# Patient Record
Sex: FEMALE | Race: WHITE | URBAN - METROPOLITAN AREA
[De-identification: names, ages, dates, MRNs, and addresses within clinical notes are randomized per-mention and may not be internally consistent; named-entity substitution may affect disease eponyms.]

---

## 2024-05-31 ENCOUNTER — OFFICE VISIT (OUTPATIENT)
Dept: URGENT CARE | Age: 64
End: 2024-05-31

## 2024-05-31 VITALS
HEIGHT: 63 IN | OXYGEN SATURATION: 96 % | BODY MASS INDEX: 31.64 KG/M2 | TEMPERATURE: 98.1 F | WEIGHT: 178.6 LBS | HEART RATE: 87 BPM | SYSTOLIC BLOOD PRESSURE: 128 MMHG | DIASTOLIC BLOOD PRESSURE: 88 MMHG

## 2024-05-31 DIAGNOSIS — R30.0 DYSURIA: ICD-10-CM

## 2024-05-31 DIAGNOSIS — R82.998 OTHER ABNORMAL FINDINGS IN URINE: ICD-10-CM

## 2024-05-31 DIAGNOSIS — N39.0 RECURRENT UTI: Primary | ICD-10-CM

## 2024-05-31 DIAGNOSIS — R10.2 SUPRAPUBIC PAIN: ICD-10-CM

## 2024-05-31 PROBLEM — M16.10 ARTHRITIS OF HIP: Status: ACTIVE | Noted: 2018-05-30

## 2024-05-31 PROBLEM — E66.3 OVERWEIGHT: Status: ACTIVE | Noted: 2023-01-03

## 2024-05-31 PROBLEM — E89.0 POSTABLATIVE HYPOTHYROIDISM: Status: ACTIVE | Noted: 2018-01-29

## 2024-05-31 PROBLEM — M48.061 LUMBAR STENOSIS: Status: ACTIVE | Noted: 2023-01-03

## 2024-05-31 PROBLEM — M16.11 PRIMARY OSTEOARTHRITIS OF RIGHT HIP: Status: ACTIVE | Noted: 2018-02-08

## 2024-05-31 PROBLEM — M19.90 ARTHRITIS: Status: ACTIVE | Noted: 2017-12-14

## 2024-05-31 PROBLEM — M43.10 SPONDYLOLISTHESIS: Status: ACTIVE | Noted: 2023-01-03

## 2024-05-31 PROBLEM — R73.03 PRE-DIABETES: Status: ACTIVE | Noted: 2018-01-29

## 2024-05-31 PROBLEM — Z98.1 POSTSURGICAL ARTHRODESIS STATUS: Status: ACTIVE | Noted: 2023-01-03

## 2024-05-31 LAB
APPEARANCE FLUID: ABNORMAL
BILIRUBIN, POC: 1
BLOOD URINE, POC: ABNORMAL
CLARITY, POC: ABNORMAL
COLOR, POC: ABNORMAL
GLUCOSE URINE, POC: NEGATIVE
KETONES, POC: NEGATIVE
LEUKOCYTE EST, POC: NEGATIVE
NITRITE, POC: NEGATIVE
PH, POC: 5
PROTEIN, POC: ABNORMAL
SPECIFIC GRAVITY, POC: 1.02
UROBILINOGEN, POC: ABNORMAL

## 2024-05-31 RX ORDER — LEVOTHYROXINE SODIUM 0.2 MG/1
200 TABLET ORAL DAILY
COMMUNITY
Start: 2021-10-04

## 2024-05-31 RX ORDER — TRIAMTERENE AND HYDROCHLOROTHIAZIDE 37.5; 25 MG/1; MG/1
1 CAPSULE ORAL DAILY
COMMUNITY
Start: 2024-05-28

## 2024-05-31 RX ORDER — PHENAZOPYRIDINE HYDROCHLORIDE 200 MG/1
200 TABLET, FILM COATED ORAL 3 TIMES DAILY PRN
Qty: 9 TABLET | Refills: 0 | Status: SHIPPED | OUTPATIENT
Start: 2024-05-31 | End: 2024-06-03

## 2024-05-31 RX ORDER — CIPROFLOXACIN 500 MG/1
500 TABLET, FILM COATED ORAL 2 TIMES DAILY
Qty: 14 TABLET | Refills: 0 | Status: SHIPPED | OUTPATIENT
Start: 2024-05-31 | End: 2024-06-07

## 2024-05-31 RX ORDER — METFORMIN HYDROCHLORIDE 500 MG/1
500 TABLET, EXTENDED RELEASE ORAL 2 TIMES DAILY WITH MEALS
COMMUNITY
Start: 2021-09-21

## 2024-05-31 RX ORDER — DULOXETIN HYDROCHLORIDE 60 MG/1
60 CAPSULE, DELAYED RELEASE ORAL DAILY
COMMUNITY
Start: 2024-05-10

## 2024-05-31 RX ORDER — FENOFIBRATE 160 MG/1
160 TABLET ORAL DAILY
COMMUNITY

## 2024-05-31 RX ORDER — ATENOLOL 25 MG/1
25 TABLET ORAL 2 TIMES DAILY
COMMUNITY
Start: 2024-01-02

## 2024-05-31 RX ORDER — CELECOXIB 200 MG/1
200 CAPSULE ORAL DAILY
COMMUNITY

## 2024-05-31 NOTE — PATIENT INSTRUCTIONS
Urine culture sent to confirm, to identify pathogen, and to clarify sensitivities.   Will augment treatment plan as necessary pending culture results.  Cipro is prescribed for antibiotic treatment  Take the antibiotics until completed, do not stop unless otherwise directed by a healthcare provider.  Recommend daily yogurt or other probiotics while on antibiotics.  Pyridium prescribed for relief of dysuria.  Increase water intake during treatment.  Can take ibuprofen (Motrin or Advil) or Acetaminophen (Tylenol) for pain symptoms.  Follow up with your PCP within 5 days or, if unable, you can return to the clinic if symptoms persist beyond 5 days or if symptoms worsen.    If fevers, shivering, nausea, vomiting, shortness of breath, chest pain, if severe abdominal or back pain develops, or if symptoms continue to worsen despite treatment plan, follow up with the ER for further evaluation.    New Prescriptions    CIPROFLOXACIN (CIPRO) 500 MG TABLET    Take 1 tablet by mouth 2 times daily for 7 days    PHENAZOPYRIDINE (PYRIDIUM) 200 MG TABLET    Take 1 tablet by mouth 3 times daily as needed for Pain

## 2024-05-31 NOTE — PROGRESS NOTES
Elza Armijo (: 1960) is a 64 y.o. female, New patient, here for evaluation of the following chief complaint(s):  Dysuria (On  patient started experiencing pain with urination, completed an e-visit and was prescribed 5 days of cefidinir (temporarily helped). Pain is now worse + is now having abdominal pain and being unable to completely void. )      ASSESSMENT/PLAN:    ICD-10-CM    1. Recurrent UTI  N39.0 Culture, Urine     phenazopyridine (PYRIDIUM) 200 MG tablet     ciprofloxacin (CIPRO) 500 MG tablet      2. Dysuria  R30.0 POCT Urinalysis no Micro     Culture, Urine     phenazopyridine (PYRIDIUM) 200 MG tablet      3. Other abnormal findings in urine  R82.998 Culture, Urine      4. Suprapubic pain  R10.2 Culture, Urine          UA showing blood and increased protein, and with symptoms, consistent for UTI  Exam without concerns for complicated UTI (no CVA tenderness, fevers, nausea, vomiting, or abdominal pain).  Low concern for sepsis, pyelonephritis, nephrolith, colitis, diverticulitis, appendicitis, and vulvo-vaginitis.  Urine culture sent to confirm, to identify pathogen, and to clarify sensitivities.   Will augment treatment plan as necessary pending culture results.  Cipro is prescribed for empiric antibiotic treatment due to concerns for recurrent UTI.  Pyridium prescribed for relief of dysuria.  Increase water intake during treatment.  ibuprofen (Motrin or Advil) or Acetaminophen (Tylenol) for pain symptoms.  Strict ED follow up instructions provided.    Discussed PCP follow up for persisting or worsening symptoms, or to return to the clinic if unable to obtain PCP follow up for worsening symptoms.    The patient tolerated their visit well. The patient and/or the family were informed of the results of any tests, a time was given to answer questions, a plan was proposed and they agreed with plan. Reviewed AVS with treatment instructions and answered questions - pt/family expresses

## 2024-06-02 LAB
BACTERIA UR CULT: ABNORMAL
ORGANISM: ABNORMAL